# Patient Record
(demographics unavailable — no encounter records)

---

## 2024-10-08 NOTE — HISTORY OF PRESENT ILLNESS
[de-identified] : 49-year-old male [place] [employer] employee presents for evaluation of left knee injury that occurred on 10/4/24. He reports he turned and hit his knee on the desk next to him. He went to urgent care the following morning and had x-rays which were negative. He was given a knee immobilizer and crutches which he has been using. He was initially unable to bear weight but has been able to put some weight on the leg now. He has been taking Tylenol and icing for pain with good relief. He has not worked since the date of injury.

## 2024-10-08 NOTE — PHYSICAL EXAM
[Slightly Antalgic] : slightly antalgic [Crutches] : ambulates with crutches [DP] : dorsalis pedis 2+ and symmetric bilaterally [PT] : posterior tibial 2+ and symmetric bilaterally [Normal] : Alert and in no acute distress [Poor Appearance] : well-appearing [Acute Distress] : not in acute distress [Obese] : not obese [de-identified] : The patient has no respiratory distress. Mood and affect are normal. The patient is alert and oriented to person, place and time. There is no pain with motion of the hips.  There is no tenderness of either hip.  Examination of the left knee demonstrates anterior tenderness.  Quadriceps and hamstring function are intact.  There is no instability of collateral or cruciate ligaments.  Range of motion 0 to 60 degrees on the left limited by pain.  Calves are soft and nontender.  The skin is intact.  There is no lymphedema. [de-identified] : Procedure was performed at the [facility]  EXAM: KNEE LEFT PROCEDURE DATE: 10/05/2024 INTERPRETATION: History:Knee pain  3 views of the left knee were obtained  Impression: There is no acute fracture or dislocation. The joint spaces are preserved. Moderate knee joint effusion.  --- End of Report ---   ELSY PERKINS MD; Attending Radiologist This document has been electronically signed. Oct 5 2024 10:30AM

## 2024-10-08 NOTE — REASON FOR VISIT
[Initial Visit] : an initial visit for [Workers' Comp: Date of Injury: _______] : This visit is related to worker's compensation. Date of Injury: [unfilled] [FreeTextEntry2] : left knee pain

## 2024-10-08 NOTE — HISTORY OF PRESENT ILLNESS
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 He reports he turned and hit his knee on the desk next to him. He went to urgent care the following morning and had x-rays which were negative. He was given a knee immobilizer and crutches which he has been using. He was initially unable to bear weight but has been able to put some weight on the leg now. He has been taking Tylenol and icing for pain with good relief. He has not worked since the date of injury.

## 2024-10-08 NOTE — DISCUSSION/SUMMARY
[de-identified] : The patient sustained a contusion to his left knee.  I have discussed the pathology, natural history and treatment options with him.  He is referred for physical therapy to restore range of motion and strength.  He will ice his knee.  He may bear weight to tolerance.  He will be reevaluated in 3 weeks.

## 2024-10-08 NOTE — DISCUSSION/SUMMARY
[de-identified] : The patient sustained a contusion to his left knee.  I have discussed the pathology, natural history and treatment options with him.  He is referred for physical therapy to restore range of motion and strength.  He will ice his knee.  He may bear weight to tolerance.  He will be reevaluated in 3 weeks.

## 2024-10-08 NOTE — PHYSICAL EXAM
[Slightly Antalgic] : slightly antalgic [Crutches] : ambulates with crutches [DP] : dorsalis pedis 2+ and symmetric bilaterally [PT] : posterior tibial 2+ and symmetric bilaterally [Normal] : Alert and in no acute distress [Poor Appearance] : well-appearing [Acute Distress] : not in acute distress [Obese] : not obese [de-identified] : The patient has no respiratory distress. Mood and affect are normal. The patient is alert and oriented to person, place and time. There is no pain with motion of the hips.  There is no tenderness of either hip.  Examination of the left knee demonstrates anterior tenderness.  Quadriceps and hamstring function are intact.  There is no instability of collateral or cruciate ligaments.  Range of motion 0 to 60 degrees on the left limited by pain.  Calves are soft and nontender.  The skin is intact.  There is no lymphedema. [de-identified] : Procedure was performed at the [facility]  EXAM: KNEE LEFT PROCEDURE DATE: 10/05/2024 INTERPRETATION: History:Knee pain  3 views of the left knee were obtained  Impression: There is no acute fracture or dislocation. The joint spaces are preserved. Moderate knee joint effusion.  --- End of Report ---   ELSY PERKINS MD; Attending Radiologist This document has been electronically signed. Oct 5 2024 10:30AM

## 2024-11-15 NOTE — HISTORY OF PRESENT ILLNESS
[de-identified] : The patient presents for reevaluation of left knee pain. He has been participating in PT 2 x per week for the past 2 weeks and working on HEP with good relief. He feels he still needs to walk with the assistance of a cane. He does not feel the need to take any medication for the pain. He has returned to work.

## 2024-11-15 NOTE — DISCUSSION/SUMMARY
[de-identified] : The patient has sustained a contusion to his left knee.  He is having some improvement with physical therapy but still complains of pain and stiffness.  I think he requires further physical therapy.  He feels he needs to use a cane when ambulating.  He will work on strengthening and range of motion and be reevaluated in 3 weeks.  He has been working.

## 2024-11-15 NOTE — PHYSICAL EXAM
[Slightly Antalgic] : slightly antalgic [Crutches] : ambulates with crutches [DP] : dorsalis pedis 2+ and symmetric bilaterally [PT] : posterior tibial 2+ and symmetric bilaterally [Normal] : Alert and in no acute distress [Poor Appearance] : well-appearing [Acute Distress] : not in acute distress [Obese] : not obese [de-identified] : The patient has no respiratory distress. Mood and affect are normal. The patient is alert and oriented to person, place and time. There is no pain with motion of the hips.  There is no tenderness of either hip.  Examination of the left knee demonstrates anterior tenderness over medial femoral condyle.  Quadriceps and hamstring function are intact.  There is no instability of collateral or cruciate ligaments.  Range of motion 20 to 80 degrees on the left limited by pain.  Calves are soft and nontender.  The skin is intact.  There is no lymphedema.

## 2024-12-05 NOTE — PHYSICAL EXAM
[Slightly Antalgic] : slightly antalgic [Crutches] : ambulates with crutches [DP] : dorsalis pedis 2+ and symmetric bilaterally [PT] : posterior tibial 2+ and symmetric bilaterally [Normal] : Alert and in no acute distress [Poor Appearance] : well-appearing [Acute Distress] : not in acute distress [Obese] : not obese [de-identified] : The patient has no respiratory distress. Mood and affect are normal. The patient is alert and oriented to person, place and time. There is no pain with motion of the hips.  There is no tenderness of either hip.  Examination of the left knee demonstrates anterior tenderness over medial femoral condyle.  Quadriceps and hamstring function are intact.  There is no instability of collateral or cruciate ligaments.  Range of motion 0 to 110 degrees on the left with mild pain.  Calves are soft and nontender.  The skin is intact.  There is no lymphedema.

## 2024-12-05 NOTE — DISCUSSION/SUMMARY
[de-identified] : The patient has persistent left knee pain.  He is making some improvement with physical therapy.  I recommend he continue his physical therapy treatment and try to walk without a cane.  He will be reevaluated in 1 month.

## 2024-12-05 NOTE — HISTORY OF PRESENT ILLNESS
[de-identified] : The patient presents for reevaluation of left knee pain. He has continued participating in PT 2 x per week with good improvement. He feels he still needs to walk with the assistance of a cane but has been weaning. He does not feel the need to take any medication for the pain. He has returned to work.

## 2025-01-07 NOTE — PHYSICAL EXAM
[Slightly Antalgic] : slightly antalgic [Crutches] : ambulates with crutches [DP] : dorsalis pedis 2+ and symmetric bilaterally [PT] : posterior tibial 2+ and symmetric bilaterally [Normal] : Alert and in no acute distress [Poor Appearance] : well-appearing [Acute Distress] : not in acute distress [Obese] : not obese [de-identified] : The patient has no respiratory distress. Mood and affect are normal. The patient is alert and oriented to person, place and time. There is no pain with motion of the hips.  There is no tenderness of either hip.  Examination of the left knee demonstrates anterior tenderness over medial femoral condyle.  Quadriceps and hamstring function are intact.  He does have quadriceps weakness.  There is no instability of collateral or cruciate ligaments.  Range of motion 10 to 120 degrees on the left with mild pain.  Calves are soft and nontender.  The skin is intact.  There is no lymphedema.

## 2025-01-07 NOTE — HISTORY OF PRESENT ILLNESS
[de-identified] : The patient presents for reevaluation of left knee pain. He has continued participating in PT 2 x per week with good improvement. He has not been using a cane anymore. He has returned to work.

## 2025-01-07 NOTE — DISCUSSION/SUMMARY
[de-identified] : The patient has had some improvement in the condition of his left knee.  He still has some quadriceps weakness.  He lacks extension.  He needs to wean himself from the brace.  He will continue physical therapy.  He will be reevaluated in 1 month.